# Patient Record
Sex: FEMALE | Race: WHITE | URBAN - NONMETROPOLITAN AREA
[De-identification: names, ages, dates, MRNs, and addresses within clinical notes are randomized per-mention and may not be internally consistent; named-entity substitution may affect disease eponyms.]

---

## 2021-11-24 ENCOUNTER — TELEPHONE (OUTPATIENT)
Dept: NEUROLOGY | Age: 36
End: 2021-11-24

## 2021-11-24 NOTE — TELEPHONE ENCOUNTER
Called and left a voicemail for the patient to let them know we have received their referral. Asked for them to call us back to get the appointment scheduled, left call back number.

## 2022-01-17 ENCOUNTER — OFFICE VISIT (OUTPATIENT)
Dept: NEUROLOGY | Age: 37
End: 2022-01-17
Payer: COMMERCIAL

## 2022-01-17 VITALS
DIASTOLIC BLOOD PRESSURE: 61 MMHG | HEIGHT: 70 IN | HEART RATE: 92 BPM | OXYGEN SATURATION: 100 % | BODY MASS INDEX: 22.05 KG/M2 | WEIGHT: 154 LBS | SYSTOLIC BLOOD PRESSURE: 119 MMHG

## 2022-01-17 DIAGNOSIS — R93.0 ABNORMAL MRI OF HEAD: ICD-10-CM

## 2022-01-17 DIAGNOSIS — Z86.69 HISTORY OF MIGRAINE: ICD-10-CM

## 2022-01-17 DIAGNOSIS — R42 DIZZY: Primary | ICD-10-CM

## 2022-01-17 PROCEDURE — 99204 OFFICE O/P NEW MOD 45 MIN: CPT | Performed by: PSYCHIATRY & NEUROLOGY

## 2022-01-17 RX ORDER — PREDNISONE 20 MG/1
TABLET ORAL
COMMUNITY
Start: 2021-11-12

## 2022-01-17 RX ORDER — LEVONORGESTREL / ETHINYL ESTRADIOL AND ETHINYL ESTRADIOL 150-30(84)
KIT ORAL
COMMUNITY

## 2022-01-17 RX ORDER — ACETAMINOPHEN AND CODEINE PHOSPHATE 120; 12 MG/5ML; MG/5ML
SOLUTION ORAL
COMMUNITY

## 2022-01-17 RX ORDER — FERROUS SULFATE 325(65) MG
TABLET ORAL
COMMUNITY

## 2022-01-17 NOTE — PROGRESS NOTES
REVIEW OF SYSTEMS    Constitutional: []Fever []Sweats []Chills [] Recent Injury   [x] Denies all unless marked  HENT:[]Headache  [] Head Injury  [] Sore Throat  [] Ear Pain  [] Dizziness [] Hearing Loss   [x] Denies all unless marked  Musculoskeletal: [] Arthralgia  [] Myalgias [x] Muscle cramps  [x] Muscle twitches   [x] Denies all unless marked   Spine:  [] Neck pain  [] Back pain  [] Sciaticia  [x] Denies all unless marked  Neurological:[] Visual Disturbance [] Double Vision [] Slurred Speech [] Trouble swallowing  [] Vertigo [] Tingling [x] Numbness [] Weakness [] Loss of Balance   [] Loss of Consciousness [] Memory Loss [] Seizures  [] Denies all unless marked  Psychiatric/Behavioral:[] Depression [] Anxiety  [x] Denies all unless marked  Sleep: []  Insomnia [] Sleep Disturbance [] Snoring [] Restless Legs [] Daytime Sleepiness [] Sleep Apnea  [x] Denies all unless marked

## 2022-01-17 NOTE — PROGRESS NOTES
Chief Complaint   Patient presents with    New Patient    Multiple Sclerosis       Tamara Tatum is a 31y.o. year old female who is seen for evaluation of possible multiple sclerosis for second opinion. She is referred from Dr. Shira Newby in [place]. Those records are reviewed. In late summer 2020 she developed vertigo with nausea. She also has had some migraine with aura. She had an MRI of the head last year which was reported to show to questionable brainstem lesions. Evoked potentials were negative as was her CSF for oligoclonal bands. When she was seen in September 2021 she was felt to have some weakness of the left arm and leg with no involvement of the face. The patient says that she was unaware that she had any weakness whatsoever on her left side. She was given 3 days of Solu-Medrol which did not seem to do anything 1 way or the other according to her except keep her awake. She was scheduled to begin Vumerity but wanted to seek a second opinion. Apparently her mother and sister have multiple sclerosis. Her MRI films and previous neurological records were all reviewed today. The patient relates that her dizziness was more of orthostatic hypotension rather than vertigo today. She says that her left leg has always acted funny intermittently since she was a kid. She did have unremarkable MRIs of the cervical and thoracic spines. She is here today with her . Active Ambulatory Problems     Diagnosis Date Noted    No Active Ambulatory Problems     Resolved Ambulatory Problems     Diagnosis Date Noted    No Resolved Ambulatory Problems     Past Medical History:   Diagnosis Date    MS (mitral stenosis)        History reviewed. No pertinent surgical history.     Family History   Problem Relation Age of Onset    Mult Sclerosis Mother     Mult Sclerosis Sister        Allergies   Allergen Reactions    Other Hives and Shortness Of Breath     NUTS       Social History Socioeconomic History    Marital status:      Spouse name: Not on file    Number of children: Not on file    Years of education: Not on file    Highest education level: Not on file   Occupational History    Not on file   Tobacco Use    Smoking status: Never Smoker    Smokeless tobacco: Never Used   Substance and Sexual Activity    Alcohol use: Yes     Comment: OCCASIONAL     Drug use: Never    Sexual activity: Not on file   Other Topics Concern    Not on file   Social History Narrative    Not on file     Social Determinants of Health     Financial Resource Strain:     Difficulty of Paying Living Expenses: Not on file   Food Insecurity:     Worried About Running Out of Food in the Last Year: Not on file    Cata of Food in the Last Year: Not on file   Transportation Needs:     Lack of Transportation (Medical): Not on file    Lack of Transportation (Non-Medical): Not on file   Physical Activity:     Days of Exercise per Week: Not on file    Minutes of Exercise per Session: Not on file   Stress:     Feeling of Stress : Not on file   Social Connections:     Frequency of Communication with Friends and Family: Not on file    Frequency of Social Gatherings with Friends and Family: Not on file    Attends [employer] Services: Not on file    Active Member of [address] [place] [address] [employer] or Organizations: Not on file    Attends Club or Organization Meetings: Not on file    Marital Status: Not on file   Intimate Partner Violence:     Fear of Current or Ex-Partner: Not on file    Emotionally Abused: Not on file    Physically Abused: Not on file    Sexually Abused: Not on file   Housing Stability:     Unable to Pay for Housing in the Last Year: Not on file    Number of [place] in the Last Year: Not on file    Unstable Housing in the Last Year: Not on file       Review of Systems      Constitutional: []? Fever []? Sweats []? Chills []? Recent Injury   [x]? Denies all unless marked  HENT:[]? Headache  []? Head Injury  []? Sore Throat  []? Ear Pain  []? Dizziness []? Hearing Loss   [x]? Denies all unless marked  Musculoskeletal: []? Arthralgia  []? Myalgias [x]? Muscle cramps  [x]? Muscle twitches   [x]? Denies all unless marked   Spine:  []? Neck pain  []? Back pain  []? Sciaticia  [x]? Denies all unless marked  Neurological:[]? Visual Disturbance []? Double Vision []? Slurred Speech []? Trouble swallowing  []? Vertigo []? Tingling [x]? Numbness []? Weakness []? Loss of Balance   []? Loss of Consciousness []? Memory Loss []? Seizures  []? Denies all unless marked  Psychiatric/Behavioral:[]? Depression []? Anxiety  [x]? Denies all unless marked  Sleep: []? Insomnia []? Sleep Disturbance []? Snoring []? Restless Legs []? Daytime Sleepiness []? Sleep Apnea  [x]? Denies all unless marked       Current Outpatient Medications   Medication Sig Dispense Refill    Cholecalciferol (VITAMIN D3) 125 MCG (5000 UT) TABS Take by mouth daily      predniSONE (DELTASONE) 20 MG tablet TAKE 25 TABLETS BY MOUTH ONCE A DAY FOR 2 DAYS (Patient not taking: Reported on 1/17/2022)      miconazole (MICOTIN) 2 % cream miconazole nitrate 2 % topical cream   APPLY TO THE AFFECTED AREA(S) BY TOPICAL ROUTE 2 TIMES PER DAY IN [facility] AND EVENING (Patient not taking: Reported on 1/17/2022)      ferrous sulfate (IRON 325) 325 (65 Fe) MG tablet ferrous sulfate 325 mg (65 mg iron) tablet   Take 1 tablet every day by oral route. (Patient not taking: Reported on 1/17/2022)      norethindrone (DEBRA-BE) 0.35 MG tablet Debra-BE 0.35 mg tablet   Take 1 tablet every day by oral route. (Patient not taking: Reported on 1/17/2022)      Levonorgest-Eth Estrad 91-Day (SEASONIQUE) 0.15-0.03 &0.01 MG TABS Seasonique 0.15 mg-30 mcg (84)/10 mcg(7) tablets,3 month dose pack   Take 1 tablet every day by oral route.  (Patient not taking: Reported on 1/17/2022)      Prenatal Vit-Fe Fum-FA-Omega (PRENATAL FORMULA PO) Take by mouth (Patient not taking: Reported on 1/17/2022)       No current facility-administered medications for this visit. Outpatient Medications Marked as Taking for the 1/17/22 encounter (Office Visit) with Gregory Shelton MD   Medication Sig Dispense Refill    Cholecalciferol (VITAMIN D3) 125 MCG (5000 UT) TABS Take by mouth daily         /61   Pulse 92   Ht 5' 10\" (1.778 m)   Wt 154 lb (69.9 kg)   SpO2 100%   BMI 22.10 kg/m²       Constitutional - well developed, well nourished. Eyes - conjunctiva normal.  Pupils react to light  Ear, nose, throat -hearing intact to finger rub No scars, masses, or lesions over external nose or ears, no atrophy of tongue  Neck-symmetric, no masses noted, no jugular vein distension. No bruits noted. Respiration- chest wall appears symmetric, good expansion,   normal effort without use of accessory muscles  Cardiovascular- RRR  Musculoskeletal - no significant wasting of muscles noted, no bony deformities, gait no gross ataxia  Extremities-no clubbing, cyanosis or edema  Skin - warm, dry, and intact. No rash, erythema, or pallor. Psychiatric - mood, affect, and behavior appear normal.      Neurological exam  Awake, alert, fluent oriented x 3 appropriate affect  Attention and concentration appear appropriate  Recent and remote memory appears unremarkable  Speech normal without dysarthria  No clear issues with language of fund of knowledge    Cranial Nerve Exam   CN II- Visual fields grossly unremarkable. VA adequate.  Discs sharp  CN III, IV,VI- PERRLA, EOMI, No nystagmus, conjugate eye movements, no ptosis  CN V-sensation intact to LT over face  CN VII-no facial asymmetry  CN VIII-Hearing intact   CN IX and X- Palate elevates in midline  CN XI-good shoulder shrug  CN XII-Tongue midline with no fasciculations or fibrillations    Motor Exam  V/V throughout upper and lower extremities bilaterally, no cogwheeling, normal tone    Sensory Exam  Sensation intact to light touch , PP  upper and lower extremities bilaterally; normal vibration sense in LE's    Reflexes   2+ biceps bilaterally  2+ brachioradialis  2+ triceps  2+patella  2+ ankle jerks  No clonus ankles  No Doan's sign bilateral hands. No Babinski sign. Tremors- no tremors in hands or head noted    Gait  Normal base and speed  No ataxia. No Romberg sign    Coordination  Finger to nose and [place]-unremarkable        Assessment    ICD-10-CM    1. Dizzy  R42    2. Abnormal MRI of head  R93.0    3. History of migraine  Z86.69      This patient has a nonfocal neurological examination. I felt that her MRI of the head was within normal limits and she has no evidence of oligoclonal bands on CSF testing. So, in spite of her having a family history of MS, I find no convincing evidence that she has it. I recommend no treatment for MS at this time but to get a follow-up MRI of the head in 6 months. She will notify me in the meantime should she develop any additional neurological difficulties. Plan    Return in about 6 months (around 7/17/2022).     (Please note that portions of this note were completed with a voice recognition program. Efforts were made to edit the dictations but occasionally words are mis-transcribed.)

## 2022-01-20 ENCOUNTER — TELEPHONE (OUTPATIENT)
Dept: NEUROLOGY | Age: 37
End: 2022-01-20

## 2022-01-20 NOTE — TELEPHONE ENCOUNTER
Patient is asking if someone can call to request her MRI results from [address] in [place] at [phone].

## 2022-02-02 NOTE — TELEPHONE ENCOUNTER
Patient calling to see if Dr Daryn Castro received her MRI from [facility] in [place] ,patient asking if she get the results

## 2022-02-03 NOTE — TELEPHONE ENCOUNTER
Tried to call pt to provide her with the following information and there was no answer message left for her to call the office back. Advise from Dr. Kelley Courser. Please notify the patient that I reviewed her MRI yesterday. Saman Garcia felt that it was within normal limits.  I find no convincing evidence for MS in her at this time. Triston Claire will get a follow-up MRI of the head at this hospital in about 6 months. Saman Garcia will see her back in the office at that time.

## 2022-02-03 NOTE — TELEPHONE ENCOUNTER
I called patient and notified that I do not see a report on her chart. I explained that I will check nucleus on Monday and If I dont see the report, I will order it.

## 2022-07-08 ENCOUNTER — TELEPHONE (OUTPATIENT)
Dept: NEUROLOGY | Age: 37
End: 2022-07-08

## 2022-07-08 NOTE — TELEPHONE ENCOUNTER
Attempted to call pt to r/s appt 7/22/22 @ 11AM due to provider being out of office.  Lt vm for pt to call office back to r/s this appt

## 2025-06-30 ENCOUNTER — APPOINTMENT (OUTPATIENT)
Dept: URBAN - METROPOLITAN AREA CLINIC 298 | Age: 40
Setting detail: DERMATOLOGY
End: 2025-06-30

## 2025-06-30 VITALS — HEIGHT: 55 IN | RESPIRATION RATE: 18 BRPM | WEIGHT: 165 LBS

## 2025-06-30 DIAGNOSIS — D22 MELANOCYTIC NEVI: ICD-10-CM

## 2025-06-30 DIAGNOSIS — Z71.89 OTHER SPECIFIED COUNSELING: ICD-10-CM

## 2025-06-30 DIAGNOSIS — L81.4 OTHER MELANIN HYPERPIGMENTATION: ICD-10-CM

## 2025-06-30 DIAGNOSIS — D18.0 HEMANGIOMA: ICD-10-CM

## 2025-06-30 DIAGNOSIS — D49.2 NEOPLASM OF UNSPECIFIED BEHAVIOR OF BONE, SOFT TISSUE, AND SKIN: ICD-10-CM

## 2025-06-30 PROBLEM — D18.01 HEMANGIOMA OF SKIN AND SUBCUTANEOUS TISSUE: Status: ACTIVE | Noted: 2025-06-30

## 2025-06-30 PROBLEM — D22.5 MELANOCYTIC NEVI OF TRUNK: Status: ACTIVE | Noted: 2025-06-30

## 2025-06-30 PROBLEM — D22.62 MELANOCYTIC NEVI OF LEFT UPPER LIMB, INCLUDING SHOULDER: Status: ACTIVE | Noted: 2025-06-30

## 2025-06-30 PROCEDURE — OTHER COUNSELING: OTHER

## 2025-06-30 PROCEDURE — 11102 TANGNTL BX SKIN SINGLE LES: CPT

## 2025-06-30 PROCEDURE — OTHER ADDITIONAL NOTES: OTHER

## 2025-06-30 PROCEDURE — 99203 OFFICE O/P NEW LOW 30 MIN: CPT | Mod: 25

## 2025-06-30 PROCEDURE — OTHER BIOPSY BY SHAVE METHOD: OTHER

## 2025-06-30 PROCEDURE — OTHER MIPS QUALITY: OTHER

## 2025-06-30 ASSESSMENT — LOCATION DETAILED DESCRIPTION DERM
LOCATION DETAILED: SUPERIOR THORACIC SPINE
LOCATION DETAILED: RIGHT MEDIAL UPPER BACK
LOCATION DETAILED: LEFT ANTERIOR SHOULDER
LOCATION DETAILED: INFERIOR THORACIC SPINE
LOCATION DETAILED: EPIGASTRIC SKIN

## 2025-06-30 ASSESSMENT — LOCATION ZONE DERM
LOCATION ZONE: TRUNK
LOCATION ZONE: ARM

## 2025-06-30 ASSESSMENT — LOCATION SIMPLE DESCRIPTION DERM
LOCATION SIMPLE: RIGHT UPPER BACK
LOCATION SIMPLE: LEFT SHOULDER
LOCATION SIMPLE: ABDOMEN
LOCATION SIMPLE: UPPER BACK